# Patient Record
(demographics unavailable — no encounter records)

---

## 2019-06-24 NOTE — NUR
ED Nurse Note:

 Pt was brought in ED by parent from home, c/o for stiches removal on right 
thumb which was injuried on last thursday. Pt is A/O X 4. VSS.

## 2019-06-24 NOTE — NUR
ER DISCHARGE NOTE:

Patient is cleared to be discharged per Cherelle Willett/PA. Pt is aox4 on 
room air with stable vital signs. Pt was given dc and prescription instructions 
and was able to verbalize understanding. Pt's ID band removed. Pt is able to 
ambulate with steady gait and  took all belongings.

## 2019-06-24 NOTE — EMERGENCY ROOM REPORT
History of Present Illness


General


Chief Complaint:  Wound Recheck/Suture Removal


Source:  Family Member





Present Illness


HPI


5-year-old female with no significant past medical history brought in by mom 

for suture removal of her finger.  Patient had sutures removed 10 days ago.  

Has minimal bleeding however denies fever and chills.  Rating the pain 3 out of 

10 mom has been applying Neosporin.  Denies any new injury, has full sensation, 

and is able to move her finger.  Denies chest pain, shortness of breath, 

palpitation, and all other associated symptoms.


Allergies:  


Coded Allergies:  


     No Known Allergies (Unverified , 10/16/15)





Patient History


Past Medical History:  see triage record


Past Surgical History:  unable to obtain


Pertinent Family History:  no significant inherited disorders


Social History:  none


Immunizations:  UTD


Reviewed Nursing Documentation:  PMH: Agreed; PSxH: Agreed





Nursing Documentation-PMH


Past Medical History:  No Stated History





Review of Systems


All Other Systems:  negative except mentioned in HPI





Physical Exam


Physical Exam





Vital Signs








  Date Time  Temp Pulse Resp B/P (MAP) Pulse Ox O2 Delivery O2 Flow Rate FiO2


 


6/24/19 19:26 98.1 126 26 98/61 98 Room Air  








Sp02 EP Interpretation:  reviewed, normal


General Appearance:  normal inspection, no apparent distress


Head:  normocephalic, atraumatic


Eyes:  bilateral eye normal inspection, bilateral eye PERRL


ENT:  normal ENT inspection


Neck:  normal inspection


Respiratory:  normal inspection, no rhonchi, no grunting


Cardiovascular:  normal inspection, RRR


Gastrointestinal:  normal inspection, non tender, no mass


Rectal:  deferred


Musculoskeletal:  normal inspection, gait & station normal


Neurologic:  normal inspection, CN II-XII intact, oriented (for age)


Psychiatric:  normal inspection, judgment & insight normal


Skin:  other - Laceration repaired right finger


Lymphatic:  normal inspection, normal cervical nodes





Procedures


Laceration/Wound Repair


Laceration/Wound Repair :  


   Consent:  Verbal


   Wound Location:  upper extremity


   Wound's Depth, Shape:  superficial


   Wound Explored:  clean


   Wound Repaired With:  Steri-strips


   Sterile Dressing Applied?:  No


   Splint Applied?:  No


   Sling Applied?:  No


   Patient Tolerated:  Well


   Complications:  None





Medical Decision Making


PA Attestation


All my diagnosis and treatment plans were reviewed ad discussed with my 

supervising physician Dr. Montaño


Diagnostic Impression:  


 Primary Impression:  


 Encounter for removal of sutures


ER Course


5-year-old female with no significant past medical history brought in by mom 

for suture removal of her finger.  Patient had sutures removed 10 days ago.  

Has minimal bleeding however denies fever and chills.  Rating the pain 3 out of 

10 mom has been applying Neosporin.  Denies any new injury, has full sensation, 

and is able to move her finger.  Denies chest pain, shortness of breath, 

palpitation, and all other associated symptoms.





Ddx considered but are not limited to : Superficial laceration, deep laceration

, tendon involvement with laceration, laceration with foreign body, 

uncomplicated suture removal





Vital signs: are WNL, pt. is afebrile





H&PE are most consistent with: Uncomplicated suture removal





ORDERS: None


ED INTERVENTIONS: Removed 7 sutures applied 3 Steri-Strips and Band-Aid





DISCHARGE: At this time pt. is stable for d/c to home. Will provide printed 

patient care instructions, and any necessary prescriptions. Care plan and 

follow up instructions have been discussed with the patient prior to discharge.





Last Vital Signs








  Date Time  Temp Pulse Resp B/P (MAP) Pulse Ox O2 Delivery O2 Flow Rate FiO2


 


6/24/19 19:26 98.1 126 26 98/61 98 Room Air  








Disposition:  HOME, SELF-CARE


Condition:  Stable


Patient Instructions:  Wound Check











Brennon Shell Jun 24, 2019 19:45